# Patient Record
Sex: MALE | Race: WHITE | Employment: FULL TIME | URBAN - METROPOLITAN AREA
[De-identification: names, ages, dates, MRNs, and addresses within clinical notes are randomized per-mention and may not be internally consistent; named-entity substitution may affect disease eponyms.]

---

## 2017-04-04 ENCOUNTER — HOSPITAL ENCOUNTER (EMERGENCY)
Age: 27
Discharge: HOME | End: 2017-04-04
Attending: EMERGENCY MEDICINE | Admitting: EMERGENCY MEDICINE
Payer: COMMERCIAL

## 2017-04-04 VITALS
DIASTOLIC BLOOD PRESSURE: 75 MMHG | HEIGHT: 70 IN | BODY MASS INDEX: 22.19 KG/M2 | RESPIRATION RATE: 16 BRPM | WEIGHT: 155 LBS | HEART RATE: 93 BPM | SYSTOLIC BLOOD PRESSURE: 148 MMHG | OXYGEN SATURATION: 100 % | TEMPERATURE: 98.1 F

## 2017-04-04 DIAGNOSIS — S61.012A: Primary | ICD-10-CM

## 2017-04-04 PROCEDURE — 90471 IMMUNIZATION ADMIN: CPT

## 2017-04-04 PROCEDURE — 99282 EMERGENCY DEPT VISIT SF MDM: CPT

## 2017-04-04 PROCEDURE — 77030031132 HC SUT NYL COVD -A

## 2017-04-04 PROCEDURE — 74011250636 HC RX REV CODE- 250/636: Performed by: EMERGENCY MEDICINE

## 2017-04-04 PROCEDURE — 90715 TDAP VACCINE 7 YRS/> IM: CPT | Performed by: EMERGENCY MEDICINE

## 2017-04-04 PROCEDURE — 75810000293 HC SIMP/SUPERF WND  RPR

## 2017-04-04 PROCEDURE — 77030018836 HC SOL IRR NACL ICUM -A

## 2017-04-04 RX ORDER — CEPHALEXIN 500 MG/1
500 CAPSULE ORAL 4 TIMES DAILY
Qty: 20 CAP | Refills: 0 | Status: SHIPPED | OUTPATIENT
Start: 2017-04-04 | End: 2017-04-09

## 2017-04-04 RX ORDER — NAPROXEN 375 MG/1
375 TABLET ORAL 2 TIMES DAILY WITH MEALS
Qty: 20 TAB | Refills: 0 | Status: SHIPPED | OUTPATIENT
Start: 2017-04-04

## 2017-04-04 RX ADMIN — TETANUS TOXOID, REDUCED DIPHTHERIA TOXOID AND ACELLULAR PERTUSSIS VACCINE, ADSORBED 0.5 ML: 5; 2.5; 8; 8; 2.5 SUSPENSION INTRAMUSCULAR at 16:00

## 2017-04-04 NOTE — Clinical Note
SPECIAL DISCHARGE INSTRUCTIONS AND COMMENTS: 
 
General comments: Thank you for allowing us to provide you with excellent care today. We hope we addressed all of your concerns and needs. We strive to provide excellent quality care in the Emergency Depart ment. If you feel that you have not received excellent quality care or timely care, please ask to speak to the nurse manager. Please choose us in the future for your continued health care needs. Follow-up comments: The exam and treatment you rece ived in the Emergency Department were for an urgent problem that may be new for you and / or one which may be related to a worsening of a chronic or ongoing medical problem that you already had prior to this visit. In any case, today's treatment is not i ntended to be considered as complete care in all cases and thus, it is important that you follow-up with a doctor, nurse practitioner, or physicians assistant to: (1) confirm your diagnosis, (2) re-evaluation of changes in your illness and treatment, an d (3) for ongoing care. In some cases we may have contacted your doctor or a specific specialist who may be involved in your future evaluation and care but in any case, take this sheet with you when you go to your follow-up visit or refer to the infor stephanie on these sheets when you are calling to arrange an appointment - it may prove helpful in making the appointment. Prescribed Medications: Unless you have been directed by the provider to change your current medicines, you should continue to fernanda e them as before. If you have been prescribed medicines, please take them as directed. In some cases, these new medicines are intended to replace a medicine that you are currently taking and if so, this will be noted below.  
 
 Our expectation is that y our condition will improve by following the doctor's recommendations, however, if in the event your condition worsens or does not improve as expected, please follow-up with your PCP or if unable to reach your usual health care provider, you should return  to the Emergency Department. We are available 24 hours a day.   
 
SPECIFIC INSTRUCTIONS PROVIDED BY YOUR DOCTOR, Mikey Linton MD:

## 2017-04-04 NOTE — ED NOTES
I have reviewed discharge instructions with the patient. The patient verbalized understanding. Patient ARMBAND removed @ bedside and placed in shredder.

## 2017-04-04 NOTE — ED PROVIDER NOTES
HPI Comments: 3:25 PM  Júnior Dawson is a 23 y.o. male presenting to the ED C/O laceration to left thumb from razor blade onset just PTA. Pt states the blade slipped while he was trying to cut something. Associated sxs include bleeding (controlled). Pt has FROM of thumb. Pt is right handed. Unsure if tetanus is UTD. Pt denies PMHx, medication allergies, and any other symptoms or complaints at this time. Patient is a 33 y.o. male presenting with skin laceration. The history is provided by the patient. No  was used. Laceration    The incident occurred less than 1 hour ago. The laceration is located on the left hand. The injury mechanism is a razor. It is unknown when the patient last had a tetanus shot. History reviewed. No pertinent past medical history. History reviewed. No pertinent surgical history. Family History:   Problem Relation Age of Onset    Anemia Mother        Social History     Social History    Marital status: SINGLE     Spouse name: N/A    Number of children: N/A    Years of education: N/A     Occupational History    Not on file. Social History Main Topics    Smoking status: Former Smoker     Types: Cigarettes    Smokeless tobacco: Former User     Quit date: 12/29/2013    Alcohol use Yes    Drug use: No    Sexual activity: Not on file     Other Topics Concern    Not on file     Social History Narrative         ALLERGIES: Review of patient's allergies indicates no known allergies. Review of Systems   Skin: Positive for wound (left thumb). All other systems reviewed and are negative. Vitals:    04/04/17 1509   BP: 148/75   Pulse: 93   Resp: 16   Temp: 98.1 °F (36.7 °C)   SpO2: 100%   Weight: 70.3 kg (155 lb)   Height: 5' 10\" (1.778 m)            Physical Exam   Nursing note and vitals reviewed. GEN:  Nontoxic child, alert, active, consolable and interacts well. Appears well hydrated. SKIN:  Warm and dry, no rashes. No petechia. Good skin turgor. HEENT:  Normocephalic. Oral mucosa moist,  NECK:  Supple. EXT:  Moves all extremities well. Capillary refill less than 2 seconds. No gross deformities  NEURO: Alert, interactive and age appropriate behavior. No gross neurological deficits    FOCUSED EXAM: Left nondominant hand on the thumb: Superficial laceration of approximately 2 cm to DIPJ, no active bleeding. No deep tissues involved. No obvious tendon injury. MDM  Number of Diagnoses or Management Options  Diagnosis management comments: INITIAL CLINICAL IMPRESSION and PLANS:  The patient presents with the primary complaint(s) of: laceration to left hand (non dominant hand). The presentation, to include historical aspects and clinical findings appear to be consistent with the DX of accidental self inflicted laceration to his left thumb. Considering the above, my initial management plan to evaluate and therapeutic interventions include: Initiate Medications and or IV Fluids,  As well as those noted in the orders:    Coco Boyd MD      ED Course       Wound Repair  Date/Time: 4/4/2017 4:07 PM  Performed by: attendingPreparation: skin prepped with Shur-Clens and sterile field established  Pre-procedure re-eval: Immediately prior to the procedure, the patient was reevaluated and found suitable for the planned procedure and any planned medications. Time out: Immediately prior to the procedure a time out was called to verify the correct patient, procedure, equipment, staff and marking as appropriate. .  Location details: left thumb  Wound length:2.5 cm or less  Anesthesia: local infiltration and digital block    Anesthesia:  Anesthesia: local infiltration and digital block  Local Anesthetic: lidocaine 1% without epinephrine   Anesthetic total: 3 mL  Foreign bodies: no foreign bodies  Irrigation solution: saline (Shur clens)  Irrigation method: syringe  Skin closure: 4-0 nylon  Number of sutures: 4  Technique: simple and interrupted  Approximation: close  Dressing: antibiotic ointment (Band aid)  Patient tolerance: Patient tolerated the procedure well with no immediate complications  My total time at bedside, performing this procedure was 1-15 minutes. FINAL CLINICAL IMPRESSION AND DISPOSITION DECISION  - DISCHARGE:  3:27 PM  I reviewed our electronic medical record system for any past medical records that were available that may contribute to the patients current condition, the nursing notes and vital signs from today's visit. Based on the clinical presentation and findings the clinical impression noted below is straight forward. Studies indicated and / or done during this ED encounter:   NONE INDICATED AT THIS TIME    RESULTS:   No orders to display       Labs Reviewed - No data to display   No results found for this or any previous visit (from the past 12 hour(s)). Intervention(s) while in ED: NONE INDICATED AT THIS TIME    MEDICATIONS GIVEN:   Medications   diph,Pertuss(AC),Tet Vac-PF (BOOSTRIX) suspension 0.5 mL (0.5 mL IntraMUSCular Given 4/4/17 1600)       Present condition:  STABLE    Planned Treatment Management Upon Discharge: SYMPTOMATIC TREATMENT    DISCUSSION RELATED TO ENCOUNTER: Encounter was straightforward and as noted above. SPECIFIC CONVERSATIONS:     I feel that we have optimized outpatient assessment and management such that Júnior Dawson is stable to be discharged and to continue with him care or complete any additional evaluation as appropriate at home or as an outpatient. DISCHARGE NOTE:   Charity Benitez  results have been reviewed with him. He  has been counseled regarding his  diagnosis. He  verbally conveys understanding and agreement of the signs, symptoms, diagnosis, treatment and prognosis and additionally agrees to follow up as recommended with None  In 24 - 48 hours. He  also agrees with the care-plan and conveys that all of his questions have been answered.   I have also put together some discharge instructions for him that include: 1) educational information regarding their diagnosis, 2) how to care for their diagnosis at home, as well a 3) list of reasons why they would want to return to the ED prior to their follow-up appointment, should their condition change. The patient and/or family has been provided with education for proper Emergency Department utilization. CLINICAL IMPRESSION  1. Laceration of thumb without complication, left, initial encounter          PLAN:  1. D/C home  2. Patient's Medications   Start Taking    CEPHALEXIN (KEFLEX) 500 MG CAPSULE    Take 1 Cap by mouth four (4) times daily for 5 days. NAPROXEN (NAPROSYN) 375 MG TABLET    Take 1 Tab by mouth two (2) times daily (with meals). Continue Taking    No medications on file   These Medications have changed    No medications on file   Stop Taking    No medications on file     3. Follow-up Information     Follow up With Details Comments [facility], DO Go in 10 days Your primary doctor or the one listed, or this department, , For suture removal [address]      THE [facility] EMERGENCY DEPT  As needed, If symptoms worsen [address] [address]  [phone]        Return if sxs worsen    ATTESTATIONS:  This note is prepared by Che Brewer, acting as Scribe for Katty James MD.     Katty James MD: The scribe's documentation has been prepared under my direction and personally reviewed by me in its entirety. I confirm that the note above accurately reflects all work, treatment, procedures, and medical decision making performed by me.

## 2017-04-04 NOTE — DISCHARGE INSTRUCTIONS
Cuts Closed With Stitches: Care Instructions  Your Care Instructions  A cut can happen anywhere on your body. The doctor used stitches to close the cut. Using stitches also helps the cut heal and reduces scarring. Sometimes pieces of tape called Steri-Strips are put over the stitches. If the cut went deep and through the skin, the doctor may have put in two layers of stitches. The deeper layer brings the deep part of the cut together. These stitches will dissolve and don't need to be removed. The stitches in the upper layer are the ones you see on the cut. You will probably have a bandage over the stitches. You will need to have the stitches removed, usually in 10 to 14 days. The doctor has checked you carefully, but problems can develop later. If you notice any problems or new symptoms, get medical treatment right away. Follow-up care is a key part of your treatment and safety. Be sure to make and go to all appointments, and call your doctor if you are having problems. It's also a good idea to know your test results and keep a list of the medicines you take. How can you care for yourself at home? · Keep the cut dry for the first 24 to 48 hours. After this, you can shower if your doctor okays it. Pat the cut dry. · Don't soak the cut, such as in a bathtub. Your doctor will tell you when it's safe to get the cut wet. · If your doctor told you how to care for your cut, follow your doctor's instructions. If you did not get instructions, follow this general advice:  ¨ After the first 24 to 48 hours, wash around the cut with clean water 2 times a day. Don't use hydrogen peroxide or alcohol, which can slow healing. ¨ You may cover the cut with a thin layer of petroleum jelly, such as Vaseline, and a nonstick bandage. ¨ Apply more petroleum jelly and replace the bandage as needed. · Prop up the sore area on a pillow anytime you sit or lie down during the next 3 days.  Try to keep it above the level of your heart. This will help reduce swelling. · Avoid any activity that could cause your cut to reopen. · Do not remove the stitches on your own. Your doctor will tell you when to come back to have the stitches removed. · Leave Steri-Strips on until they fall off. · Be safe with medicines. Read and follow all instructions on the label. ¨ If the doctor gave you a prescription medicine for pain, take it as prescribed. ¨ If you are not taking a prescription pain medicine, ask your doctor if you can take an over-the-counter medicine. When should you call for help? Call your doctor now or seek immediate medical care if:  · You have new pain, or your pain gets worse. · The skin near the cut is cold or pale or changes color. · You have tingling, weakness, or numbness near the cut. · The cut starts to bleed, and blood soaks through the bandage. Oozing small amounts of blood is normal.  · You have trouble moving the area near the cut. · You have symptoms of infection, such as:  ¨ Increased pain, swelling, warmth, or redness around the cut. ¨ Red streaks leading from the cut. ¨ Pus draining from the cut. ¨ A fever. Watch closely for changes in your health, and be sure to contact your doctor if:  · The cut reopens. · You do not get better as expected. Where can you learn more? Go to http://chris-norberto.info/. Enter R217 in the search box to learn more about \"Cuts Closed With Stitches: Care Instructions. \"  Current as of: May 27, 2016  Content Version: 11.2  © 0612-8631 [employer]. Care instructions adapted under license by [employer] (which disclaims liability or warranty for this information). If you have questions about a medical condition or this instruction, always ask your healthcare professional. [address] any warranty or liability for your use of this information.          Wound Check: Care Instructions  Your Care Instructions  People have wounds that need care for many reasons. You may have a cut that needs care after surgery. You may have a cut or puncture wound from an accident. Or you may have a wound because of a condition like diabetes. Whatever the cause of your wound, there are things you can do to care for it at home. Your doctor may also want you to come back for a wound check. The wound check lets the doctor know how your wound is healing and if you need more treatment. Follow-up care is a key part of your treatment and safety. Be sure to make and go to all appointments, and call your doctor if you are having problems. It's also a good idea to know your test results and keep a list of the medicines you take. How can you care for yourself at home? · If your doctor told you how to care for your wound, follow your doctor's instructions. If you did not get instructions, follow this general advice:  ¨ You may cover the wound with a thin layer of petroleum jelly, such as Vaseline, and a nonstick bandage. ¨ Apply more petroleum jelly and replace the bandage as needed. · Keep the wound dry for the first 24 to 48 hours. After this, you can shower if your doctor okays it. Pat the wound dry. · Be safe with medicines. Read and follow all instructions on the label. ¨ If the doctor gave you a prescription medicine for pain, take it as prescribed. ¨ If you are not taking a prescription pain medicine, ask your doctor if you can take an over-the-counter medicine. · If your doctor prescribed antibiotics, take them as directed. Do not stop taking them just because you feel better. You need to take the full course of antibiotics. · If you have stitches, do not remove them on your own. Your doctor will tell you when to come back to have them removed. · If you have Steri-Strips, leave them on until they fall off. · If possible, prop up the injured area on a pillow anytime you sit or lie down during the next 3 days.  Try to keep it above the level of your heart. This will help reduce swelling. When should you call for help? Call your doctor now or seek immediate medical care if:  · You have new pain, or the pain gets worse. · The skin near the wound is cold or pale or changes color. · You have tingling, weakness, or numbness near the wound. · The wound starts to bleed, and blood soaks through the bandage. Oozing small amounts of blood is normal.  · You have symptoms of infection, such as:  ¨ Increased pain, swelling, warmth, or redness. ¨ Red streaks leading from the wound. ¨ Pus draining from the wound. ¨ A fever. Watch closely for changes in your health, and be sure to contact your doctor if:  · You do not get better as expected. Where can you learn more? Go to http://chris-norberto.info/. Enter P342 in the search box to learn more about \"Wound Check: Care Instructions. \"  Current as of: May 27, 2016  Content Version: 11.2  © 6100-8147 [employer]. Care instructions adapted under license by [employer] (which disclaims liability or warranty for this information). If you have questions about a medical condition or this instruction, always ask your healthcare professional. [address] any warranty or liability for your use of this information.

## 2017-04-04 NOTE — LETTER
[facility] [place] 
[facility] 
[address] [address] 
[phone] Work/School Note Date: 4/4/2017 To Whom It May concern: 
 
Sylvie Wolf was seen and treated today in the emergency room by the following provider(s): 
Attending Provider: Inna Martinez MD.   
 
Sylvie Wolf may return to work on 4/6/18. Sincerely, Michelle Sanchez RN